# Patient Record
Sex: FEMALE | Race: WHITE | Employment: FULL TIME | URBAN - METROPOLITAN AREA
[De-identification: names, ages, dates, MRNs, and addresses within clinical notes are randomized per-mention and may not be internally consistent; named-entity substitution may affect disease eponyms.]

---

## 2017-01-27 ENCOUNTER — HOSPITAL ENCOUNTER (OUTPATIENT)
Dept: WOMENS IMAGING | Age: 47
Discharge: HOME | End: 2017-01-27
Attending: OBSTETRICS & GYNECOLOGY | Admitting: OBSTETRICS & GYNECOLOGY

## 2017-01-27 DIAGNOSIS — Z12.31 VISIT FOR SCREENING MAMMOGRAM: ICD-10-CM

## 2018-01-29 ENCOUNTER — HOSPITAL ENCOUNTER (OUTPATIENT)
Dept: WOMENS IMAGING | Age: 48
Discharge: HOME | End: 2018-01-29
Attending: FAMILY MEDICINE | Admitting: FAMILY MEDICINE

## 2018-01-29 DIAGNOSIS — Z12.31 VISIT FOR SCREENING MAMMOGRAM: ICD-10-CM

## 2019-05-29 ENCOUNTER — HOSPITAL ENCOUNTER (OUTPATIENT)
Dept: WOMENS IMAGING | Age: 49
Discharge: HOME | End: 2019-05-29
Payer: COMMERCIAL

## 2019-05-29 DIAGNOSIS — Z12.39 BREAST CANCER SCREENING: ICD-10-CM

## 2019-05-29 PROCEDURE — 77067 SCR MAMMO BI INCL CAD: CPT

## 2019-05-30 ENCOUNTER — CASE MANAGEMENT (OUTPATIENT)
Dept: WOMENS IMAGING | Age: 49
End: 2019-05-30

## 2019-06-04 ENCOUNTER — HOSPITAL ENCOUNTER (OUTPATIENT)
Dept: ULTRASOUND IMAGING | Age: 49
Discharge: HOME | End: 2019-06-04
Payer: COMMERCIAL

## 2019-06-04 ENCOUNTER — HOSPITAL ENCOUNTER (OUTPATIENT)
Dept: WOMENS IMAGING | Age: 49
Discharge: HOME | End: 2019-06-04
Payer: COMMERCIAL

## 2019-06-04 DIAGNOSIS — R92.8 ABNORMAL MAMMOGRAM: ICD-10-CM

## 2019-06-04 PROCEDURE — G0279 TOMOSYNTHESIS, MAMMO: HCPCS

## 2019-06-04 PROCEDURE — 76642 ULTRASOUND BREAST LIMITED: CPT

## 2024-06-27 ENCOUNTER — OFFICE VISIT (OUTPATIENT)
Dept: ORTHOPEDIC SURGERY | Age: 54
End: 2024-06-27

## 2024-06-27 VITALS — BODY MASS INDEX: 22.49 KG/M2 | HEIGHT: 65 IN | WEIGHT: 135 LBS

## 2024-06-27 DIAGNOSIS — M25.561 ACUTE PAIN OF BOTH KNEES: Primary | ICD-10-CM

## 2024-06-27 DIAGNOSIS — M22.42 CHONDROMALACIA OF BOTH PATELLAE: ICD-10-CM

## 2024-06-27 DIAGNOSIS — M25.562 ACUTE PAIN OF BOTH KNEES: Primary | ICD-10-CM

## 2024-06-27 DIAGNOSIS — M65.9 SYNOVITIS OF BOTH KNEE JOINTS: ICD-10-CM

## 2024-06-27 DIAGNOSIS — M22.41 CHONDROMALACIA OF BOTH PATELLAE: ICD-10-CM

## 2024-06-27 RX ORDER — GABAPENTIN 300 MG/1
300 CAPSULE ORAL NIGHTLY
COMMUNITY
Start: 2024-06-10 | End: 2024-09-08

## 2024-06-27 RX ORDER — METHYLPREDNISOLONE 4 MG/1
TABLET ORAL
Qty: 21 KIT | Refills: 0 | Status: SHIPPED | OUTPATIENT
Start: 2024-06-27

## 2024-06-27 RX ORDER — CELECOXIB 200 MG/1
200 CAPSULE ORAL DAILY
Qty: 30 CAPSULE | Refills: 3 | Status: SHIPPED | OUTPATIENT
Start: 2024-06-27

## 2024-06-27 NOTE — PROGRESS NOTES
Number of Visits Requested:   1       Treatment Plan:  Treatment options were discussed with Grace Lockhart.  We did review her plain films and exam findings.  She has been experiencing primarily chondromalacia patella with mild osteoarthritis and maltracking symptoms since November 2024.  We discussed further workup with imaging however she has had little way of treatment.  She did see a chiropractor Dr. Vergara who instructed her on stretching program which has helped and she has been using Voltaren gel.  We discussed further workup with imaging however will try initial conservative treatment.  After discussing options, we did place her on a Medrol Dosepak to be followed by short-term Celebrex 200 mg daily.  I think she will benefit from physical therapy formally for VMO strengthening and hamstring flexibility.  She does have custom orthotics and I encouraged her to utilize these.  I am okay with her doing her water aerobics.  Will see her back in about 4 weeks for follow-up and consider imaging at her steroid injections if she remains symptomatic.  She will contact us in the interim with questions or concerns.    CC: Dr. Laureen Tidwell    This dictation was performed with a verbal recognition program (DRAGON) and it was checked for errors. It is possible that there are still dictated errors within this office note. If so, please bring any errors to my attention for an addendum. All efforts were made to ensure that this office note is accurate.

## 2024-06-27 NOTE — PATIENT INSTRUCTIONS
Take Medrol first for 6 days. This is a steroid pack. Flip the package over to the foil side and the directions will tell you to start with 6 pills the first day, 5 pills the second day, etc. Please do not take any other anti-inflammatories with the medrol dose kenji as this can upset your stomach. If something else is needed, you may take extra strength tylenol.     Once you are finished with the medrol, then you may re-start or start your anti-inflammatory: CELEBREX 1 TIME PER DAY

## 2024-07-03 ENCOUNTER — HOSPITAL ENCOUNTER (OUTPATIENT)
Dept: PHYSICAL THERAPY | Age: 54
Setting detail: THERAPIES SERIES
Discharge: HOME | End: 2024-07-03
Payer: COMMERCIAL

## 2024-07-03 PROCEDURE — 97161 PT EVAL LOW COMPLEX 20 MIN: CPT

## 2024-07-03 PROCEDURE — 97110 THERAPEUTIC EXERCISES: CPT

## 2024-07-03 NOTE — PLAN OF CARE
Flags:  None  C-SSRS Triggered by Intake questionnaire:   Patient answered 'NO' to both behavioral questions on intake.  No further screening warranted    Preferred Language for Healthcare:   [x] English       [] other:    SUBJECTIVE EXAMINATION     Patient stated complaint: bilateral knee pain, left > right. This began about 6 months ago, when she began water aerobics. The frequency of pain increased through this period. She followed up with MD. She was prescribed a Dosepak and additional medication. Patient reports moderate relief with this intervention. Then, last night after completing water aerobics, she had increased knee pain when trying to take care of her garden.     She does report a hx of LBP, which has improved with water aerobics.       Hx from chart review: bilateral knee pain that started approximately 6+ months ago since roughly November 2023. Patient states that she has never had a history of pain in her knees in the past. There is no history of injury fall or trauma but her pain symptoms began after starting a water aerobics program at work twice weekly in October.  The pain initially started with one knee and now both of her knees are bothering her. She was prescribed voltaren gel, which she forgets to use, but when she uses it, it helps her with the pain. She saw a chiropractor in feb 2024. Since then she has scarlet doing stretches which helps her with the pain. Patient states her pain comes and goes and is 5/10 on a scale, with 10 being the worst pain. She denies locking catching true instability symptoms and has had very infrequent pseudo buckling.  Climbing stairs squatting kneeling and lunging are painful.  She has infrequent night pain and has had crepitation and popping in the past.      Test used Initial score  7/3/24 07/03/2024   Pain Summary VAS 5-6/10 worst 0/10 current   Functional questionnaire WOMAC 10.4% deficit     Other:              Pain:  Pain location: infrapatellar bilaterally